# Patient Record
Sex: MALE | Race: WHITE | Employment: UNEMPLOYED | URBAN - METROPOLITAN AREA
[De-identification: names, ages, dates, MRNs, and addresses within clinical notes are randomized per-mention and may not be internally consistent; named-entity substitution may affect disease eponyms.]

---

## 2019-08-08 ENCOUNTER — HOSPITAL ENCOUNTER (INPATIENT)
Age: 39
LOS: 4 days | Discharge: ANOTHER HOSPITAL | DRG: 302 | End: 2019-08-12
Attending: FAMILY MEDICINE | Admitting: FAMILY MEDICINE
Payer: MEDICARE

## 2019-08-08 LAB
APTT: 32.9 SEC (ref 26–36)
HCT VFR BLD CALC: 40.6 % (ref 40.5–52.5)
HEMOGLOBIN: 13.5 G/DL (ref 13.5–17.5)
INR BLD: 0.98 (ref 0.86–1.14)
MCH RBC QN AUTO: 29.8 PG (ref 26–34)
MCHC RBC AUTO-ENTMCNC: 33.3 G/DL (ref 31–36)
MCV RBC AUTO: 89.6 FL (ref 80–100)
PDW BLD-RTO: 13.6 % (ref 12.4–15.4)
PLATELET # BLD: 298 K/UL (ref 135–450)
PMV BLD AUTO: 9.2 FL (ref 5–10.5)
PROTHROMBIN TIME: 11.2 SEC (ref 9.8–13)
RBC # BLD: 4.53 M/UL (ref 4.2–5.9)
TROPONIN: 0.03 NG/ML
WBC # BLD: 8.8 K/UL (ref 4–11)

## 2019-08-08 PROCEDURE — 2060000000 HC ICU INTERMEDIATE R&B

## 2019-08-08 PROCEDURE — 36415 COLL VENOUS BLD VENIPUNCTURE: CPT

## 2019-08-08 PROCEDURE — 93005 ELECTROCARDIOGRAM TRACING: CPT | Performed by: NURSE PRACTITIONER

## 2019-08-08 PROCEDURE — 84484 ASSAY OF TROPONIN QUANT: CPT

## 2019-08-08 PROCEDURE — 6370000000 HC RX 637 (ALT 250 FOR IP): Performed by: NURSE PRACTITIONER

## 2019-08-08 PROCEDURE — 80048 BASIC METABOLIC PNL TOTAL CA: CPT

## 2019-08-08 PROCEDURE — 85610 PROTHROMBIN TIME: CPT

## 2019-08-08 PROCEDURE — 85730 THROMBOPLASTIN TIME PARTIAL: CPT

## 2019-08-08 PROCEDURE — 85027 COMPLETE CBC AUTOMATED: CPT

## 2019-08-08 RX ORDER — TRAZODONE HYDROCHLORIDE 50 MG/1
100 TABLET ORAL NIGHTLY
Status: DISCONTINUED | OUTPATIENT
Start: 2019-08-08 | End: 2019-08-13 | Stop reason: HOSPADM

## 2019-08-08 RX ORDER — SODIUM CHLORIDE 0.9 % (FLUSH) 0.9 %
10 SYRINGE (ML) INJECTION EVERY 12 HOURS SCHEDULED
Status: DISCONTINUED | OUTPATIENT
Start: 2019-08-08 | End: 2019-08-13 | Stop reason: HOSPADM

## 2019-08-08 RX ORDER — ATORVASTATIN CALCIUM 80 MG/1
80 TABLET, FILM COATED ORAL DAILY
Status: DISCONTINUED | OUTPATIENT
Start: 2019-08-09 | End: 2019-08-13 | Stop reason: HOSPADM

## 2019-08-08 RX ORDER — FUROSEMIDE 10 MG/ML
40 INJECTION INTRAMUSCULAR; INTRAVENOUS DAILY
Status: DISCONTINUED | OUTPATIENT
Start: 2019-08-09 | End: 2019-08-10

## 2019-08-08 RX ORDER — SODIUM CHLORIDE 0.9 % (FLUSH) 0.9 %
10 SYRINGE (ML) INJECTION PRN
Status: DISCONTINUED | OUTPATIENT
Start: 2019-08-08 | End: 2019-08-13 | Stop reason: HOSPADM

## 2019-08-08 RX ORDER — LISINOPRIL 20 MG/1
20 TABLET ORAL DAILY
Status: DISCONTINUED | OUTPATIENT
Start: 2019-08-09 | End: 2019-08-13 | Stop reason: HOSPADM

## 2019-08-08 RX ORDER — METOPROLOL TARTRATE 50 MG/1
50 TABLET, FILM COATED ORAL 2 TIMES DAILY
Status: DISCONTINUED | OUTPATIENT
Start: 2019-08-08 | End: 2019-08-08

## 2019-08-08 RX ORDER — ACETAMINOPHEN 325 MG/1
650 TABLET ORAL EVERY 4 HOURS PRN
Status: DISCONTINUED | OUTPATIENT
Start: 2019-08-08 | End: 2019-08-13 | Stop reason: HOSPADM

## 2019-08-08 RX ORDER — ASPIRIN 81 MG/1
81 TABLET, CHEWABLE ORAL DAILY
Status: DISCONTINUED | OUTPATIENT
Start: 2019-08-09 | End: 2019-08-13 | Stop reason: HOSPADM

## 2019-08-08 RX ORDER — ONDANSETRON 2 MG/ML
4 INJECTION INTRAMUSCULAR; INTRAVENOUS EVERY 6 HOURS PRN
Status: DISCONTINUED | OUTPATIENT
Start: 2019-08-08 | End: 2019-08-13 | Stop reason: HOSPADM

## 2019-08-08 RX ORDER — CARVEDILOL 6.25 MG/1
6.25 TABLET ORAL 2 TIMES DAILY WITH MEALS
Status: DISCONTINUED | OUTPATIENT
Start: 2019-08-09 | End: 2019-08-13 | Stop reason: HOSPADM

## 2019-08-08 RX ORDER — ALBUTEROL SULFATE 90 UG/1
2 AEROSOL, METERED RESPIRATORY (INHALATION) EVERY 4 HOURS PRN
Status: DISCONTINUED | OUTPATIENT
Start: 2019-08-08 | End: 2019-08-13 | Stop reason: HOSPADM

## 2019-08-09 ENCOUNTER — APPOINTMENT (OUTPATIENT)
Dept: GENERAL RADIOLOGY | Age: 39
DRG: 302 | End: 2019-08-09
Attending: FAMILY MEDICINE
Payer: MEDICARE

## 2019-08-09 ENCOUNTER — APPOINTMENT (OUTPATIENT)
Dept: CT IMAGING | Age: 39
DRG: 302 | End: 2019-08-09
Attending: FAMILY MEDICINE
Payer: MEDICARE

## 2019-08-09 LAB
ANION GAP SERPL CALCULATED.3IONS-SCNC: 16 MMOL/L (ref 3–16)
BASE EXCESS ARTERIAL: 2.7 MMOL/L (ref -3–3)
BILIRUBIN URINE: NEGATIVE
BLOOD, URINE: NEGATIVE
BUN BLDV-MCNC: 28 MG/DL (ref 7–20)
CALCIUM SERPL-MCNC: 8.8 MG/DL (ref 8.3–10.6)
CARBOXYHEMOGLOBIN ARTERIAL: 1 % (ref 0–1.5)
CHLORIDE BLD-SCNC: 99 MMOL/L (ref 99–110)
CLARITY: ABNORMAL
CO2: 23 MMOL/L (ref 21–32)
COLOR: YELLOW
CREAT SERPL-MCNC: 1.4 MG/DL (ref 0.9–1.3)
EKG ATRIAL RATE: 83 BPM
EKG DIAGNOSIS: NORMAL
EKG P AXIS: 62 DEGREES
EKG P-R INTERVAL: 146 MS
EKG Q-T INTERVAL: 398 MS
EKG QRS DURATION: 102 MS
EKG QTC CALCULATION (BAZETT): 467 MS
EKG R AXIS: 28 DEGREES
EKG T AXIS: 186 DEGREES
EKG VENTRICULAR RATE: 83 BPM
EPITHELIAL CELLS, UA: 0 /HPF (ref 0–5)
GFR AFRICAN AMERICAN: >60
GFR NON-AFRICAN AMERICAN: 56
GLUCOSE BLD-MCNC: 108 MG/DL (ref 70–99)
GLUCOSE URINE: NEGATIVE MG/DL
HCO3 ARTERIAL: 27.4 MMOL/L (ref 21–29)
HEMOGLOBIN, ART, EXTENDED: 14.1 G/DL (ref 13.5–17.5)
HYALINE CASTS: 0 /LPF (ref 0–8)
KETONES, URINE: NEGATIVE MG/DL
LEUKOCYTE ESTERASE, URINE: NEGATIVE
METHEMOGLOBIN ARTERIAL: 0.4 %
MICROSCOPIC EXAMINATION: YES
NITRITE, URINE: NEGATIVE
O2 CONTENT ARTERIAL: 19 ML/DL
O2 SAT, ARTERIAL: 96.6 %
O2 THERAPY: NORMAL
PCO2 ARTERIAL: 41.2 MMHG (ref 35–45)
PH ARTERIAL: 7.43 (ref 7.35–7.45)
PH UA: 7.5 (ref 5–8)
PO2 ARTERIAL: 81.1 MMHG (ref 75–108)
POTASSIUM SERPL-SCNC: 3.9 MMOL/L (ref 3.5–5.1)
PROTEIN UA: NEGATIVE MG/DL
RBC UA: 0 /HPF (ref 0–4)
SODIUM BLD-SCNC: 138 MMOL/L (ref 136–145)
SPECIFIC GRAVITY UA: 1.01 (ref 1–1.03)
TCO2 ARTERIAL: 64.1 MMOL/L
URINE TYPE: ABNORMAL
UROBILINOGEN, URINE: 0.2 E.U./DL
WBC UA: 0 /HPF (ref 0–5)

## 2019-08-09 PROCEDURE — 94760 N-INVAS EAR/PLS OXIMETRY 1: CPT

## 2019-08-09 PROCEDURE — 93880 EXTRACRANIAL BILAT STUDY: CPT

## 2019-08-09 PROCEDURE — 6360000002 HC RX W HCPCS: Performed by: NURSE PRACTITIONER

## 2019-08-09 PROCEDURE — 70450 CT HEAD/BRAIN W/O DYE: CPT

## 2019-08-09 PROCEDURE — 2060000000 HC ICU INTERMEDIATE R&B

## 2019-08-09 PROCEDURE — 99223 1ST HOSP IP/OBS HIGH 75: CPT | Performed by: THORACIC SURGERY (CARDIOTHORACIC VASCULAR SURGERY)

## 2019-08-09 PROCEDURE — 93010 ELECTROCARDIOGRAM REPORT: CPT | Performed by: INTERNAL MEDICINE

## 2019-08-09 PROCEDURE — 82803 BLOOD GASES ANY COMBINATION: CPT

## 2019-08-09 PROCEDURE — 2580000003 HC RX 258: Performed by: NURSE PRACTITIONER

## 2019-08-09 PROCEDURE — 71045 X-RAY EXAM CHEST 1 VIEW: CPT

## 2019-08-09 PROCEDURE — 71250 CT THORAX DX C-: CPT

## 2019-08-09 PROCEDURE — 81001 URINALYSIS AUTO W/SCOPE: CPT

## 2019-08-09 PROCEDURE — 99223 1ST HOSP IP/OBS HIGH 75: CPT | Performed by: INTERNAL MEDICINE

## 2019-08-09 PROCEDURE — 94010 BREATHING CAPACITY TEST: CPT

## 2019-08-09 PROCEDURE — 93971 EXTREMITY STUDY: CPT

## 2019-08-09 PROCEDURE — 36600 WITHDRAWAL OF ARTERIAL BLOOD: CPT

## 2019-08-09 PROCEDURE — 6370000000 HC RX 637 (ALT 250 FOR IP): Performed by: NURSE PRACTITIONER

## 2019-08-09 RX ADMIN — TRAZODONE HYDROCHLORIDE 100 MG: 50 TABLET ORAL at 21:17

## 2019-08-09 RX ADMIN — Medication 10 ML: at 21:18

## 2019-08-09 RX ADMIN — Medication 10 ML: at 00:01

## 2019-08-09 RX ADMIN — TRAZODONE HYDROCHLORIDE 100 MG: 50 TABLET ORAL at 00:01

## 2019-08-09 RX ADMIN — CARVEDILOL 6.25 MG: 6.25 TABLET, FILM COATED ORAL at 16:41

## 2019-08-09 RX ADMIN — ATORVASTATIN CALCIUM 80 MG: 80 TABLET, FILM COATED ORAL at 10:06

## 2019-08-09 RX ADMIN — FUROSEMIDE 40 MG: 10 INJECTION, SOLUTION INTRAMUSCULAR; INTRAVENOUS at 10:07

## 2019-08-09 RX ADMIN — ASPIRIN 81 MG 81 MG: 81 TABLET ORAL at 10:06

## 2019-08-09 RX ADMIN — CARVEDILOL 6.25 MG: 6.25 TABLET, FILM COATED ORAL at 10:06

## 2019-08-09 RX ADMIN — ACETAMINOPHEN 650 MG: 325 TABLET, FILM COATED ORAL at 21:17

## 2019-08-09 RX ADMIN — Medication 10 ML: at 10:08

## 2019-08-09 RX ADMIN — LISINOPRIL 20 MG: 20 TABLET ORAL at 10:06

## 2019-08-09 RX ADMIN — Medication 10 ML: at 21:19

## 2019-08-09 ASSESSMENT — PAIN SCALES - GENERAL
PAINLEVEL_OUTOF10: 0
PAINLEVEL_OUTOF10: 0
PAINLEVEL_OUTOF10: 7
PAINLEVEL_OUTOF10: 0

## 2019-08-09 ASSESSMENT — PAIN DESCRIPTION - LOCATION: LOCATION: OTHER (COMMENT)

## 2019-08-09 ASSESSMENT — PAIN DESCRIPTION - ORIENTATION: ORIENTATION: RIGHT

## 2019-08-09 ASSESSMENT — PAIN DESCRIPTION - DESCRIPTORS: DESCRIPTORS: ACHING

## 2019-08-09 ASSESSMENT — PAIN DESCRIPTION - PAIN TYPE: TYPE: ACUTE PAIN

## 2019-08-09 NOTE — CONSULTS
2, Left dorsalis pedis 2, Right posterior tibial 2, Left Posterior tibial 2, Right Femoral 2, Left Femoral 2, Right radial 2, and Left radial 2    Abdomen:  Soft, normal bowel sounds, non-tender, no hepatosplenomegaly, aorta normal and bruits absent    Musculoskeletal:  Back is straight and non-tender, full ROM of upper extremities. Decreased strength and spasticity of left lower extremity resulting in a shuffling gait. Extremities:   No clubbing, cyanosis, or edema     Skin: warm and normal turgor, no ulcers, infections, or rashes, no rashes, no ecchymoses, no petechiae, no nodules    Neurological: awake, alert and oriented x 3, motor 5/5 bilateral upper extremities. Lower extremities is 2 out of 5 in the left 5 out of 5 on the right. Increased muscle spasticity noted on the left lower extremity.   Sensation grossly intact    Psychiatric: Mood and affect appear appropriate    DATA:    EKG:    Sinus rhythm with occasional Premature ventricular complexes     CBC:   Lab Results   Component Value Date    WBC 8.8 08/08/2019    RBC 4.53 08/08/2019    HGB 13.5 08/08/2019    HCT 40.6 08/08/2019    MCV 89.6 08/08/2019    MCH 29.8 08/08/2019    MCHC 33.3 08/08/2019    RDW 13.6 08/08/2019     08/08/2019    MPV 9.2 08/08/2019     CMP:    Lab Results   Component Value Date     08/08/2019    K 3.9 08/08/2019    CL 99 08/08/2019    CO2 23 08/08/2019    BUN 28 08/08/2019    CREATININE 1.4 08/08/2019    GFRAA >60 08/08/2019    LABGLOM 56 08/08/2019    GLUCOSE 108 08/08/2019    PROT 7.6 12/12/2012    LABALBU 4.8 12/12/2012    CALCIUM 8.8 08/08/2019    BILITOT 0.40 12/12/2012    ALKPHOS 67 12/12/2012    AST 21 12/12/2012    ALT 35 12/12/2012     PT/INR:    Lab Results   Component Value Date    PROTIME 11.2 08/08/2019    PROTIME 30.2 03/19/2014    INR 0.98 08/08/2019     PTT:    Lab Results   Component Value Date    APTT 32.9 08/08/2019   [APTT}  Last 3 Troponin:    Lab Results   Component Value Date    TROPONINI 0.03 08/08/2019       ECHO/TORRI: 8/6/19  Left ventricle: The cavity size was normal. Wall thickness was      increased in a pattern of mild LVH. Systolic function was      moderately to severely reduced. The estimated ejection fraction      was in the range of 30% to 35%. Diffuse hypokinesis. Features are      consistent with a pseudonormal left ventricular filling pattern,      with concomitant abnormal relaxation and increased filling      pressure (grade 2 diastolic dysfunction).     - Mitral valve: Valve area by pressure half-time: 2.3cm^2.    - Left atrium: The atrium was mildly dilated.    - Right ventricle: Systolic function was normal by visual      assessment.    - Atrial septum: No defect or patent foramen ovale was identified.      Echo contrast study showed no right-to-left atrial level shunt, at      baseline or with provocation.    - Pulmonic valve: Peak gradient (S): 10mm Hg.        Impressions:  Severe left ventricular systolic dysfunction EF 07-63%      NM Myocardial Perf Multi Spect: 8/6/19    1. .Small mild reversible defect in the basal anterior wall . No perfusion defects on stress or rest images. The left ventricular chamber size is severely dilated . Severe global hypokinesis. 2. Severe global hypokinesis. EF 19%. Angiogram: From [facility]    Right Dominant  1. LAD: Proximal vessel lesion: There is a 60% stenosis.    2. 1st diagonal: Proximal vessel lesion: There is a 70% stenosis.    3. Left circumflex: Mid-vessel lesion: There is a 50% stenosis.    4. Right coronary: Proximal vessel lesion: There is a 100% stenosis.     Neurology:10/28/16 Last Visit    Sonam Taylor MD - 10/28/2016 8:45 AM EDT    HISTORY OF PRESENT ILLNESS: Mr. Yunior Hauser is seen in follow-up for a history of posterior circulation strokes, pseudobulbar affect, and insomnia. I last saw him a year ago. He had stopped trazodone, but insomnia returned.  After discussing potential side effects including on the RPDA which feels by left-to-right collaterals, LAD, OM1 and possible diagonal.  Before scheduling for CABG we will complete standard preoperative work-up. Will need preoperative neurology clearance to assess the perioperative risk of  stroke with heparinization for cardiopulmonary bypass and to assist with perioperative management of his spasticity. Will need PT/OT evaluation and optimization of his mobility status so he can be compliant with sternotomy precautions during his postoperative recovery. We will discuss with heart failure team about consideration for preoperative optimization with inotropes before CABG. All of his questions were answered. CT surgery will follow along and make further recommendations as needed. Based on current scheduling the earliest I could perform his surgery would be Wednesday, 8/14/2019. Dr. Clayton Katz, thank you very much for allowing us to participate in the care of this patient.

## 2019-08-09 NOTE — PROGRESS NOTES
Home med rec completed. Pt states he has not had any home medications in over a month other than recent OTC prilosec.

## 2019-08-09 NOTE — PROGRESS NOTES
Per pt his doctor put his films from the heart cath in his chart over at [facility] but they didn't make it over here when he was transferred. Pt's hard copy chart and belongings checked. This RN made cath lab aware that the films are not here. Per cath lab request-  Films need to be sent by  or  If just report- Fax [phone] with Marielena Monsalve at [facility]. They are going to send them to [facility]- they are films one of echo and one of heart cath. Cardiology called and updated that films will be sent over.

## 2019-08-09 NOTE — PROGRESS NOTES
tenderness, not distended, normal bowel sounds  Musculoskeletal: No cyanosis in digits, neck supple  Neurology: CN 2-12 grossly intact. No speech or motor deficits  Psych: Normal affect. Alert and oriented in time, place and person  Skin: Warm, dry, normal turgor    Labs and Tests:  CBC:   Recent Labs     08/08/19  2248   WBC 8.8   HGB 13.5        BMP:  Recent Labs     08/08/19  2248      K 3.9   CL 99   CO2 23   BUN 28*   CREATININE 1.4*   GLUCOSE 108*     Hepatic: No results for input(s): AST, ALT, ALB, BILITOT, ALKPHOS in the last 72 hours. Discussed care with family and patient             Spent 30  minutes with patient and family at bedside and on unit reviewing medical records and labs, spent greater than 50% time counseling patient and family on diagnosis and plan   Problem List  Active Problems:    * No active hospital problems. *  Resolved Problems:    * No resolved hospital problems. *       Assessment & Plan:   1. Systolic CHF  - with recent CAD. Ef 30-35%. - contnue asa, cored statin.    -continue iv diuresis  - Cardiology consulted. Await recs. rec was CABG.      2. SARA  - had normal creatine about a month ago  - will monitor closely  - nephrology recs    3       Diet: Diet NPO Time Specified  Code:Full Code  DVT PPX lovenox       Syd Phillips MD   8/9/2019 10:15 AM

## 2019-08-09 NOTE — H&P
Hospital Medicine History & Physical      PCP: Faustino Iverson, pt states he sees Dr. Lincoln Hopkins    Date of Admission: 8/8/2019    Date of Service: Pt seen/examined on August 8, 2019 at 8:50pm and transferred for admission from [facility] to Inpatient with expected LOS greater than two midnights due to medical therapy. Chief Complaint:  Shortness of breath for about a month worsening recently. History Of Present Illness:      35 y.o. male with PMHx of newly diagnosed chronic systolic CHF and cardiomyopathy as below workup at OSH, PMH multiple strokes with some slowing/soft speech as baseline residual deficit, Hypertension, hyperlipidemia, depression, insomnia, GERD, TONO (does not use CPAP) presented to [facility] for CABG to be done as a transfer from [facility].    Apparently, The patient went to ER yesterday at [facility] for SOB and they worked him up as below and then after his catheterization, they admitted him but he had to leave because he has pets and lives alone. Then, His sister took him back to the ER today at [facility] and he was transferred here for CABG per Cardiology at [facility]. He had a left heart catheterization yesterday August 7, 2019 by  Belkys San MD.  Results below. [facility] work-up 8/7/19: c/o  shortness of breath. D-dimer elevated at 777, CTPA which was negative for a PE. Left ventricle, and groundglass infiltrate. Suggesting pulmonary edema from congestive heart failure. Patient has small right pleural effusion on CTPA results. Ileostomy right hilar lymphadenopathy. This may be reactive lymphadenopathy. Neoplastic etiology is a differential consideration. Consider short-term CT follow-up in 3 months to confirm. Urine drug screen positive for cannabinoid. UA otherwise negative    Chest x-ray borderline cardiomegaly with mild interstitial infiltrates. Correlate for pulmonary edema.     Troponin 0.147    Lactic acid 1, PTT 34.3, PT and INR 10.9 and 0.9 respectively. CBC with white blood cells 11.3, hemoglobin 13. Absolute neutrophils 8.3 otherwise normal.    BNP 5334. Lipase normal at 180. CMP with chloride 110, CO2 20, BUN 19, calcium 8.3, AST 43, albumin 3.2, otherwise normal.    Today's labs from 6 AM this morning when patient returned to [facility], CMP unremarkable, CBC normal.  TSH normal at 2.59. Hepatic panel grossly normal      Left heart cath angiography 8/7/19:   1. LAD: proximal vessel lesion: there is 60% stenosis  2. 1st diagonal: Proximal vessel lesion: there is 70% stenosis  3. Left circumflex: mid-vessel lesion: there is 50% stenosis  4. Right coronary: proximal lesion: 100%     Graded exercise test, 8/6/2019: Nondiagnostic stress test.  Heart rate was suboptimal to rule out ischemia. NM myocardial perfusion stress and rest SPECT: #1 small mild reversible defect in the basal  anterior wall  No perfusion defects on stress or rest images. The left ventricle chamber size is severely dilated. Severe global hypokinesis. #2 severe global hypokinesis. EF 19%. Echocardiogram 8/6/2019:  Left ventricle: Cavity size normal.  Wall thickness increased in a pattern of mild LVH. Systolic function moderately to severely reduced. Estimated ejection fraction 30 to 35%. Diffuse hypokinesis. Consistent with pseudo-normal left ventricular filling pattern, with concomitant abnormal relaxation and increased filling pressure. Grade 2 diastolic dysfunction.   Left atrium mildly dilated    Troponin 8/6/19: 0.06  Cluster ALT to 65  Triglycerides 148  HDL 45          Past Medical History:          Diagnosis Date    Arthritis     At risk for falls     Blood clots in biliary tract following procedure     CVA (cerebral infarction)     GERD (gastroesophageal reflux disease)     High blood pressure     Hyperlipidemia     Hypertension     Spasticity 1/22/2013    Stroke ([address] distress, appears stated age and cooperative. HEENT:  Normal cephalic, atraumatic without obvious deformity. Pupils equal, round, and reactive to light. Extra ocular muscles intact. Conjunctivae/corneas clear. Neck: Supple, with full range of motion. No jugular venous distention. Trachea midline. Respiratory:  Normal respiratory effort. Clear to auscultation, bilaterally without Rales/Wheezes/Rhonchi. Cardiovascular:  Regular rate and rhythm without murmurs, rubs or gallops. Abdomen: Soft, non-tender, non-distended, without rebound or guarding. Normal bowel sounds. Musculoskeletal:  No clubbing, cyanosis or edema bilaterally. Full range of motion without deformity. Skin: Skin color, texture, turgor normal.  No rashes or lesions. Neurologic:  Neurovascularly intact without any focal sensory/motor deficits. Cranial nerves: II-XII intact, grossly non-focal.  Psychiatric:  Alert and oriented, thought content appropriate, normal insight  Capillary Refill: Brisk,< 3 seconds   Peripheral Pulses: +2 palpable, equal bilaterally       Labs:     No results for input(s): WBC, HGB, HCT, PLT in the last 72 hours. No results for input(s): NA, K, CL, CO2, BUN, CREATININE, CALCIUM, PHOS in the last 72 hours. Invalid input(s): MAGNES  No results for input(s): AST, ALT, BILIDIR, BILITOT, ALKPHOS in the last 72 hours. No results for input(s): INR in the last 72 hours. No results for input(s): Sweta Timpson in the last 72 hours. Urinalysis:    No results found for: Dickie Kidney, BACTERIA, RBCUA, BLOODU, [address], [address]    Radiology:         No orders to display       ASSESSMENT:    There are no active hospital problems to display for this patient. PLAN:  1. Newly diagnosed CAD. cardiac cath 8/7/19:   1. LAD: proximal vessel lesion: 60% stenosis    2. 1st diagonal: prox vessel lesion: 70% stenosis   3. Left circumflex: mid-vessel esion: 50% stenosis   4.  Right coronary: prox vessel lesion: 100%

## 2019-08-09 NOTE — PROGRESS NOTES
[facility]   Cardiac Evaluation      Patient: Edmond Raza  YOB: 1980    CC: transfer for CAD     Referring provider: Maxim Mc    History of Present Illness:   36 yo WM admitted for evaluation for CABG. He had a cath at [facility] 2 days ago and came here for surgery. He states he was also treated for heart failure. He has a hx of strokes and his speech is difficult to understand. His trops were mildly elevated at [facility] and cath was done reported that he had multivessel disease. Frutoso Golder No films are here for review. Past Medical History:   has a past medical history of Arthritis, At risk for falls, Blood clots in biliary tract following procedure, CVA (cerebral infarction), GERD (gastroesophageal reflux disease), High blood pressure, Hyperlipidemia, Hypertension, Spasticity, Stroke ([address] and Unspecified sleep apnea. Surgical History:   has a past surgical history that includes Knee arthroscopy; hernia repair; and fracture surgery.      Current Facility-Administered Medications   Medication Dose Route Frequency Provider Last Rate Last Dose    sodium chloride flush 0.9 % injection 10 mL  10 mL Intravenous 2 times per day Lupe Seek, APRN - CNP   10 mL at 08/09/19 1008    sodium chloride flush 0.9 % injection 10 mL  10 mL Intravenous PRN Lupe Seek, APRN - CNP        magnesium hydroxide (MILK OF MAGNESIA) 400 MG/5ML suspension 30 mL  30 mL Oral Daily PRN Lupe Seek, APRN - CNP        ondansetron (ZOFRAN) injection 4 mg  4 mg Intravenous Q6H PRN Lupe Seek, APRN - CNP        acetaminophen (TYLENOL) tablet 650 mg  650 mg Oral Q4H PRN Lupe Seek, APRN - CNP        furosemide (LASIX) injection 40 mg  40 mg Intravenous Daily Lupe Seek, APRN - CNP   40 mg at 08/09/19 1007    albuterol sulfate  (90 Base) MCG/ACT inhaler 2 puff  2 puff Inhalation Q4H PRN Lupe Seek, APRN - CNP        atorvastatin (LIPITOR) tablet 80 mg  80 mg Oral Daily Ric Ardon APRN - CNP   80 mg at 08/09/19 1006    carvedilol (COREG) tablet 6.25 mg  6.25 mg Oral BID WC Ric Ardon, APRN - CNP   6.25 mg at 08/09/19 1006    aspirin chewable tablet 81 mg  81 mg Oral Daily Lincolnta Duglas, APRN - CNP   81 mg at 08/09/19 1006    lisinopril (PRINIVIL;ZESTRIL) tablet 20 mg  20 mg Oral Daily Ric Ardon APRN - CNP   20 mg at 08/09/19 1006    traZODone (DESYREL) tablet 100 mg  100 mg Oral Nightly Ric Ardon, APRN - CNP   100 mg at 08/09/19 0001       Allergies:  Pcn [penicillins] and Buspirone     Social History:  Social History     Socioeconomic History    Marital status:      Spouse name: Not on file    Number of children: 1    Years of education: Not on file    Highest education level: Not on file   Occupational History    Occupation: disabled   Social Needs    Financial resource strain: Not on file    Food insecurity:     Worry: Not on file     Inability: Not on file   [employer] needs:     Medical: Not on file     Non-medical: Not on file   Tobacco Use    Smoking status: Former Smoker   Substance and Sexual Activity    Alcohol use: No    Drug use: No    Sexual activity: Not on file   Lifestyle    Physical activity:     Days per week: Not on file     Minutes per session: Not on file    Stress: Not on file   Relationships    Social connections:     Talks on phone: Not on file     Gets together: Not on file     Attends Orthodox service: Not on file     Active member of club or organization: Not on file     Attends meetings of clubs or organizations: Not on file     Relationship status: Not on file    Intimate partner violence:     Fear of current or ex partner: Not on file     Emotionally abused: Not on file     Physically abused: Not on file     Forced sexual activity: Not on file   Other Topics Concern    Not on file   Social History Narrative    Not on file       Family History:   Family History   Problem Relation

## 2019-08-10 LAB
ANION GAP SERPL CALCULATED.3IONS-SCNC: 10 MMOL/L (ref 3–16)
BUN BLDV-MCNC: 21 MG/DL (ref 7–20)
CALCIUM SERPL-MCNC: 8.9 MG/DL (ref 8.3–10.6)
CHLORIDE BLD-SCNC: 102 MMOL/L (ref 99–110)
CO2: 26 MMOL/L (ref 21–32)
CREAT SERPL-MCNC: 1.1 MG/DL (ref 0.9–1.3)
GFR AFRICAN AMERICAN: >60
GFR NON-AFRICAN AMERICAN: >60
GLUCOSE BLD-MCNC: 122 MG/DL (ref 70–99)
LV EF: 28 %
LVEF MODALITY: NORMAL
POTASSIUM SERPL-SCNC: 4.8 MMOL/L (ref 3.5–5.1)
SODIUM BLD-SCNC: 138 MMOL/L (ref 136–145)

## 2019-08-10 PROCEDURE — 36415 COLL VENOUS BLD VENIPUNCTURE: CPT

## 2019-08-10 PROCEDURE — 80048 BASIC METABOLIC PNL TOTAL CA: CPT

## 2019-08-10 PROCEDURE — 2060000000 HC ICU INTERMEDIATE R&B

## 2019-08-10 PROCEDURE — 6370000000 HC RX 637 (ALT 250 FOR IP): Performed by: INTERNAL MEDICINE

## 2019-08-10 PROCEDURE — 94760 N-INVAS EAR/PLS OXIMETRY 1: CPT

## 2019-08-10 PROCEDURE — 6360000002 HC RX W HCPCS: Performed by: NURSE PRACTITIONER

## 2019-08-10 PROCEDURE — 99232 SBSQ HOSP IP/OBS MODERATE 35: CPT | Performed by: INTERNAL MEDICINE

## 2019-08-10 PROCEDURE — 6370000000 HC RX 637 (ALT 250 FOR IP): Performed by: NURSE PRACTITIONER

## 2019-08-10 PROCEDURE — 6370000000 HC RX 637 (ALT 250 FOR IP): Performed by: HOSPITALIST

## 2019-08-10 PROCEDURE — 2580000003 HC RX 258: Performed by: NURSE PRACTITIONER

## 2019-08-10 PROCEDURE — 93306 TTE W/DOPPLER COMPLETE: CPT

## 2019-08-10 RX ORDER — FUROSEMIDE 40 MG/1
40 TABLET ORAL 2 TIMES DAILY
Status: DISCONTINUED | OUTPATIENT
Start: 2019-08-10 | End: 2019-08-12

## 2019-08-10 RX ORDER — HYDROXYZINE HYDROCHLORIDE 25 MG/1
25 TABLET, FILM COATED ORAL 3 TIMES DAILY PRN
Status: DISCONTINUED | OUTPATIENT
Start: 2019-08-10 | End: 2019-08-13 | Stop reason: HOSPADM

## 2019-08-10 RX ORDER — PANTOPRAZOLE SODIUM 40 MG/1
40 TABLET, DELAYED RELEASE ORAL
Status: DISCONTINUED | OUTPATIENT
Start: 2019-08-10 | End: 2019-08-13 | Stop reason: HOSPADM

## 2019-08-10 RX ORDER — HYDROXYZINE PAMOATE 25 MG/1
25 CAPSULE ORAL 3 TIMES DAILY PRN
Status: DISCONTINUED | OUTPATIENT
Start: 2019-08-10 | End: 2019-08-10 | Stop reason: ALTCHOICE

## 2019-08-10 RX ADMIN — CARVEDILOL 6.25 MG: 6.25 TABLET, FILM COATED ORAL at 10:12

## 2019-08-10 RX ADMIN — LISINOPRIL 20 MG: 20 TABLET ORAL at 10:11

## 2019-08-10 RX ADMIN — Medication 10 ML: at 10:12

## 2019-08-10 RX ADMIN — TRAZODONE HYDROCHLORIDE 100 MG: 50 TABLET ORAL at 22:12

## 2019-08-10 RX ADMIN — FUROSEMIDE 40 MG: 10 INJECTION, SOLUTION INTRAMUSCULAR; INTRAVENOUS at 10:12

## 2019-08-10 RX ADMIN — Medication 10 ML: at 22:12

## 2019-08-10 RX ADMIN — HYDROXYZINE HYDROCHLORIDE 25 MG: 25 TABLET, FILM COATED ORAL at 14:14

## 2019-08-10 RX ADMIN — HYDROXYZINE HYDROCHLORIDE 25 MG: 25 TABLET, FILM COATED ORAL at 22:14

## 2019-08-10 RX ADMIN — ATORVASTATIN CALCIUM 80 MG: 80 TABLET, FILM COATED ORAL at 10:12

## 2019-08-10 RX ADMIN — ASPIRIN 81 MG 81 MG: 81 TABLET ORAL at 10:11

## 2019-08-10 RX ADMIN — PANTOPRAZOLE SODIUM 40 MG: 40 TABLET, DELAYED RELEASE ORAL at 10:11

## 2019-08-10 RX ADMIN — FUROSEMIDE 40 MG: 40 TABLET ORAL at 18:13

## 2019-08-10 ASSESSMENT — ENCOUNTER SYMPTOMS
SHORTNESS OF BREATH: 1
CHEST TIGHTNESS: 0
COUGH: 0
NAUSEA: 0
BLOOD IN STOOL: 0
VOMITING: 0
CONSTIPATION: 0
EYE REDNESS: 0
ABDOMINAL DISTENTION: 0
ABDOMINAL PAIN: 0
PHOTOPHOBIA: 0
DIARRHEA: 0
EYE DISCHARGE: 0
FACIAL SWELLING: 0

## 2019-08-10 ASSESSMENT — PAIN SCALES - GENERAL
PAINLEVEL_OUTOF10: 0

## 2019-08-10 NOTE — PROGRESS NOTES
[facility]   Cardiac Consultation    Referring Provider:  Indira Francis   CC: CAD, LV dysfxn, MARCELINO       History of Present Illness:  Alert, up in room-now back from Echo--denies CP, SOB, palpitations or syncope. Gait is unsteady due to prior CVA. Cines not yet available for review. Past Medical History:   has a past medical history of Arthritis, At risk for falls, Blood clots in biliary tract following procedure, CVA (cerebral infarction), GERD (gastroesophageal reflux disease), High blood pressure, Hyperlipidemia, Hypertension, Spasticity, Stroke [address] [address] and Unspecified sleep apnea. Review of Systems:   · Constitutional: there has been no unanticipated weight loss. There's been no change in energy level, sleep pattern, or activity level. · Eyes: No visual changes or diplopia. No scleral icterus. · ENT: No Headaches, hearing loss or vertigo. No mouth sores or sore throat. · Cardiovascular: Reviewed in HPI  · Respiratory: No cough or wheezing, no sputum production. No hematemesis. · Gastrointestinal: No abdominal pain, appetite loss, blood in stools. No change in bowel or bladder habits. · Genitourinary: No dysuria, trouble voiding, or hematuria. · Musculoskeletal:  No gait disturbance, weakness or joint complaints. · Integumentary: No rash or pruritis. · Neurological: No headache, diplopia, change in muscle strength, numbness or tingling. No change in gait, balance, coordination, mood, affect, memory, mentation, behavior. · Psychiatric: No anxiety, no depression. · Endocrine: No malaise, fatigue or temperature intolerance. No excessive thirst, fluid intake, or urination. No tremor. · Hematologic/Lymphatic: No abnormal bruising or bleeding, blood clots or swollen lymph nodes. · Allergic/Immunologic: No nasal congestion or hives.     Physical Examination:    Vitals:    08/10/19 1000   BP: 114/77   Pulse: 76   Resp: 17   Temp: 97.8 °F (36.6 °C)   SpO2: 96%          Constitutional

## 2019-08-11 LAB
ANION GAP SERPL CALCULATED.3IONS-SCNC: 11 MMOL/L (ref 3–16)
BUN BLDV-MCNC: 22 MG/DL (ref 7–20)
CALCIUM SERPL-MCNC: 9.4 MG/DL (ref 8.3–10.6)
CHLORIDE BLD-SCNC: 99 MMOL/L (ref 99–110)
CO2: 25 MMOL/L (ref 21–32)
CREAT SERPL-MCNC: 1.3 MG/DL (ref 0.9–1.3)
GFR AFRICAN AMERICAN: >60
GFR NON-AFRICAN AMERICAN: >60
GLUCOSE BLD-MCNC: 120 MG/DL (ref 70–99)
POTASSIUM SERPL-SCNC: 4.9 MMOL/L (ref 3.5–5.1)
SODIUM BLD-SCNC: 135 MMOL/L (ref 136–145)

## 2019-08-11 PROCEDURE — 94640 AIRWAY INHALATION TREATMENT: CPT

## 2019-08-11 PROCEDURE — 6360000002 HC RX W HCPCS: Performed by: INTERNAL MEDICINE

## 2019-08-11 PROCEDURE — 2060000000 HC ICU INTERMEDIATE R&B

## 2019-08-11 PROCEDURE — 2700000000 HC OXYGEN THERAPY PER DAY

## 2019-08-11 PROCEDURE — 99232 SBSQ HOSP IP/OBS MODERATE 35: CPT | Performed by: THORACIC SURGERY (CARDIOTHORACIC VASCULAR SURGERY)

## 2019-08-11 PROCEDURE — 94761 N-INVAS EAR/PLS OXIMETRY MLT: CPT

## 2019-08-11 PROCEDURE — 2580000003 HC RX 258: Performed by: NURSE PRACTITIONER

## 2019-08-11 PROCEDURE — 6370000000 HC RX 637 (ALT 250 FOR IP): Performed by: INTERNAL MEDICINE

## 2019-08-11 PROCEDURE — 6370000000 HC RX 637 (ALT 250 FOR IP): Performed by: THORACIC SURGERY (CARDIOTHORACIC VASCULAR SURGERY)

## 2019-08-11 PROCEDURE — 36415 COLL VENOUS BLD VENIPUNCTURE: CPT

## 2019-08-11 PROCEDURE — 80048 BASIC METABOLIC PNL TOTAL CA: CPT

## 2019-08-11 PROCEDURE — 6370000000 HC RX 637 (ALT 250 FOR IP): Performed by: NURSE PRACTITIONER

## 2019-08-11 RX ORDER — IPRATROPIUM BROMIDE AND ALBUTEROL SULFATE 2.5; .5 MG/3ML; MG/3ML
1 SOLUTION RESPIRATORY (INHALATION) 4 TIMES DAILY
Status: DISCONTINUED | OUTPATIENT
Start: 2019-08-11 | End: 2019-08-13 | Stop reason: HOSPADM

## 2019-08-11 RX ADMIN — IPRATROPIUM BROMIDE AND ALBUTEROL SULFATE 1 AMPULE: .5; 3 SOLUTION RESPIRATORY (INHALATION) at 17:14

## 2019-08-11 RX ADMIN — Medication 10 ML: at 22:38

## 2019-08-11 RX ADMIN — CARVEDILOL 6.25 MG: 6.25 TABLET, FILM COATED ORAL at 16:49

## 2019-08-11 RX ADMIN — PANTOPRAZOLE SODIUM 40 MG: 40 TABLET, DELAYED RELEASE ORAL at 05:02

## 2019-08-11 RX ADMIN — ENOXAPARIN SODIUM 40 MG: 40 INJECTION SUBCUTANEOUS at 08:40

## 2019-08-11 RX ADMIN — FUROSEMIDE 40 MG: 40 TABLET ORAL at 08:40

## 2019-08-11 RX ADMIN — CARVEDILOL 6.25 MG: 6.25 TABLET, FILM COATED ORAL at 08:39

## 2019-08-11 RX ADMIN — LISINOPRIL 20 MG: 20 TABLET ORAL at 08:39

## 2019-08-11 RX ADMIN — Medication 10 ML: at 08:40

## 2019-08-11 RX ADMIN — ATORVASTATIN CALCIUM 80 MG: 80 TABLET, FILM COATED ORAL at 08:39

## 2019-08-11 RX ADMIN — ASPIRIN 81 MG 81 MG: 81 TABLET ORAL at 08:40

## 2019-08-11 RX ADMIN — TRAZODONE HYDROCHLORIDE 100 MG: 50 TABLET ORAL at 22:58

## 2019-08-11 RX ADMIN — FUROSEMIDE 40 MG: 40 TABLET ORAL at 16:49

## 2019-08-11 RX ADMIN — IPRATROPIUM BROMIDE AND ALBUTEROL SULFATE 1 AMPULE: .5; 3 SOLUTION RESPIRATORY (INHALATION) at 20:40

## 2019-08-11 ASSESSMENT — PAIN SCALES - GENERAL
PAINLEVEL_OUTOF10: 0

## 2019-08-11 ASSESSMENT — ENCOUNTER SYMPTOMS
PHOTOPHOBIA: 0
DIARRHEA: 0
ABDOMINAL PAIN: 0
VOMITING: 0
CHEST TIGHTNESS: 0
FACIAL SWELLING: 0
COUGH: 0
SHORTNESS OF BREATH: 1
NAUSEA: 0
ABDOMINAL DISTENTION: 0
CONSTIPATION: 0
BLOOD IN STOOL: 0
EYE REDNESS: 0
EYE DISCHARGE: 0

## 2019-08-11 NOTE — PROGRESS NOTES
[facility]   Cardiac Consultation    Referring Provider:  Torito Avendaño   CC: CAD, LV dysfxn, MARCELINO       History of Present Illness:  Patient denies any acute events. He denies any chest pain or SOB. Gait is unsteady due to prior CVA. Cines not yet available for review. Past Medical History:   has a past medical history of Arthritis, At risk for falls, Blood clots in biliary tract following procedure, CVA (cerebral infarction), GERD (gastroesophageal reflux disease), High blood pressure, Hyperlipidemia, Hypertension, Spasticity, Stroke [facility] and Unspecified sleep apnea. Review of Systems:   · Constitutional: there has been no unanticipated weight loss. There's been no change in energy level, sleep pattern, or activity level. · Eyes: No visual changes or diplopia. No scleral icterus. · ENT: No Headaches, hearing loss or vertigo. No mouth sores or sore throat. · Cardiovascular: Reviewed in HPI  · Respiratory: No cough or wheezing, no sputum production. No hematemesis. · Gastrointestinal: No abdominal pain, appetite loss, blood in stools. No change in bowel or bladder habits. · Genitourinary: No dysuria, trouble voiding, or hematuria. · Musculoskeletal:  No gait disturbance, weakness or joint complaints. · Integumentary: No rash or pruritis. · Neurological: No headache, diplopia, change in muscle strength, numbness or tingling. No change in gait, balance, coordination, mood, affect, memory, mentation, behavior. · Psychiatric: No anxiety, no depression. · Endocrine: No malaise, fatigue or temperature intolerance. No excessive thirst, fluid intake, or urination. No tremor. · Hematologic/Lymphatic: No abnormal bruising or bleeding, blood clots or swollen lymph nodes. · Allergic/Immunologic: No nasal congestion or hives.     Physical Examination:    Vitals:    08/11/19 0838   BP: 106/64   Pulse: 75   Resp: 18   Temp: 98.3 °F (36.8 °C)   SpO2: 95%            ECHO: 30-35%    Constitutional and General Appearance:   . NAD   SKIN:  .     Warm and dry  EYES:    .     EOMI  Neck:   . Normal carotid contour  Respiratory:  · Normal excursion and expansion without use of accessory muscles  · Resp Auscultation: Normal breath sounds without dullness  Cardiovascular:  · The apical impulses not displaced  · Heart tones are crisp and normal  · Cervical veins are not engorged  · Normal S1S2, No S3, No Murmur  · Peripheral pulses are symmetrical and full  Extremities:  · There is no clubbing, cyanosis of the extremities. · No edema  · Femoral Arteries: 2+ and equal  · Pedal Pulses: 2+ and equal   Abdomen:  · No masses or tenderness  · Liver/Spleen: No Abnormalities Noted  Neurological/Psychiatric:  · Alert and oriented in all spheres  · Moves all extremities well  · Exhibits normal gait balance and coordination  · No abnormalities of mood, affect, memory, mentation, or behavior are noted    All testing and labs listed below were personally reviewed. Assessment:   Multivessel CAD  Hx of CVA  ARF  LV dysfxn- EF 25-30%    Plan:  CABG schedule mid week ?     Codi Hernandez CNP

## 2019-08-11 NOTE — PROGRESS NOTES
[facility] Note    Patient Active Problem List   Diagnosis    Spasticity    Stroke syndrome    Spasm of muscle    Left wrist injury    Strain of right wrist    Right knee pain    CAD, multiple vessel       Past Medical History:   has a past medical history of Arthritis, At risk for falls, Blood clots in biliary tract following procedure, CVA (cerebral infarction), GERD (gastroesophageal reflux disease), High blood pressure, Hyperlipidemia, Hypertension, Spasticity, Stroke ([address] and Unspecified sleep apnea. Past Social History:   reports that he has quit smoking. He does not have any smokeless tobacco history on file. He reports that he does not drink alcohol or use drugs. Subjective:  Breathing better. No chest pain    Review of Systems   Constitutional: Negative for activity change, appetite change, chills, fatigue, fever and unexpected weight change. HENT: Negative for congestion and facial swelling. Eyes: Negative for photophobia, discharge and redness. Respiratory: Positive for shortness of breath. Negative for cough and chest tightness. Cardiovascular: Negative for chest pain, palpitations and leg swelling. Gastrointestinal: Negative for abdominal distention, abdominal pain, blood in stool, constipation, diarrhea, nausea and vomiting. Endocrine: Negative for cold intolerance, heat intolerance and polyuria. Genitourinary: Negative for decreased urine volume, difficulty urinating, flank pain and hematuria. Musculoskeletal: Negative for joint swelling and neck pain. Neurological: Negative for dizziness, seizures, syncope, speech difficulty, light-headedness and headaches. Hematological: Does not bruise/bleed easily. Psychiatric/Behavioral: Negative for agitation, confusion and hallucinations.        Objective:  -2.6L    /64   Pulse 75   Temp 98.3 °F (36.8 °C) (Oral)   Resp 18   Ht 5' 11\" (1.803 m)   Wt 178

## 2019-08-11 NOTE — PLAN OF CARE
Problem: Falls - Risk of:  Goal: Will remain free from falls  Description  Will remain free from falls  [id number]/44/[id number] by Eleno Ding RN  Outcome: Ongoing  Note:   Pt remains free of falls at this time. Problem: Pain:  Goal: Pain level will decrease  Description  Pain level will decrease  4/14/4782 2523 by Eleno Ding RN  Outcome: Ongoing  Note:   Pt denies pain at this time.       Problem: ACTIVITY INTOLERANCE/IMPAIRED MOBILITY  Goal: Mobility/activity is maintained at optimum level for patient  Outcome: Ongoing  Note:   Pt ambulating in hallway without assistance

## 2019-08-12 VITALS
TEMPERATURE: 98.1 F | WEIGHT: 177.4 LBS | OXYGEN SATURATION: 97 % | HEART RATE: 74 BPM | BODY MASS INDEX: 24.84 KG/M2 | HEIGHT: 71 IN | DIASTOLIC BLOOD PRESSURE: 68 MMHG | RESPIRATION RATE: 18 BRPM | SYSTOLIC BLOOD PRESSURE: 102 MMHG

## 2019-08-12 LAB
ANION GAP SERPL CALCULATED.3IONS-SCNC: 10 MMOL/L (ref 3–16)
BUN BLDV-MCNC: 22 MG/DL (ref 7–20)
CALCIUM SERPL-MCNC: 8.8 MG/DL (ref 8.3–10.6)
CHLORIDE BLD-SCNC: 101 MMOL/L (ref 99–110)
CO2: 26 MMOL/L (ref 21–32)
CREAT SERPL-MCNC: 1.2 MG/DL (ref 0.9–1.3)
GFR AFRICAN AMERICAN: >60
GFR NON-AFRICAN AMERICAN: >60
GLUCOSE BLD-MCNC: 104 MG/DL (ref 70–99)
POTASSIUM SERPL-SCNC: 4.8 MMOL/L (ref 3.5–5.1)
SODIUM BLD-SCNC: 137 MMOL/L (ref 136–145)

## 2019-08-12 PROCEDURE — 36415 COLL VENOUS BLD VENIPUNCTURE: CPT

## 2019-08-12 PROCEDURE — 6370000000 HC RX 637 (ALT 250 FOR IP): Performed by: INTERNAL MEDICINE

## 2019-08-12 PROCEDURE — 94761 N-INVAS EAR/PLS OXIMETRY MLT: CPT

## 2019-08-12 PROCEDURE — 6360000002 HC RX W HCPCS: Performed by: INTERNAL MEDICINE

## 2019-08-12 PROCEDURE — 80048 BASIC METABOLIC PNL TOTAL CA: CPT

## 2019-08-12 PROCEDURE — 97165 OT EVAL LOW COMPLEX 30 MIN: CPT

## 2019-08-12 PROCEDURE — 94640 AIRWAY INHALATION TREATMENT: CPT

## 2019-08-12 PROCEDURE — 97161 PT EVAL LOW COMPLEX 20 MIN: CPT

## 2019-08-12 PROCEDURE — 99232 SBSQ HOSP IP/OBS MODERATE 35: CPT | Performed by: INTERNAL MEDICINE

## 2019-08-12 PROCEDURE — 2700000000 HC OXYGEN THERAPY PER DAY

## 2019-08-12 PROCEDURE — 2580000003 HC RX 258: Performed by: NURSE PRACTITIONER

## 2019-08-12 PROCEDURE — 6370000000 HC RX 637 (ALT 250 FOR IP): Performed by: THORACIC SURGERY (CARDIOTHORACIC VASCULAR SURGERY)

## 2019-08-12 PROCEDURE — 6370000000 HC RX 637 (ALT 250 FOR IP): Performed by: NURSE PRACTITIONER

## 2019-08-12 RX ORDER — FUROSEMIDE 40 MG/1
40 TABLET ORAL DAILY
Qty: 60 TABLET | Refills: 3 | Status: SHIPPED | OUTPATIENT
Start: 2019-08-13

## 2019-08-12 RX ORDER — FUROSEMIDE 40 MG/1
40 TABLET ORAL DAILY
Status: DISCONTINUED | OUTPATIENT
Start: 2019-08-13 | End: 2019-08-13 | Stop reason: HOSPADM

## 2019-08-12 RX ORDER — LISINOPRIL 20 MG/1
20 TABLET ORAL DAILY
Qty: 30 TABLET | Refills: 3 | Status: SHIPPED | OUTPATIENT
Start: 2019-08-13

## 2019-08-12 RX ORDER — CARVEDILOL 6.25 MG/1
6.25 TABLET ORAL 2 TIMES DAILY WITH MEALS
Qty: 60 TABLET | Refills: 3 | Status: SHIPPED | OUTPATIENT
Start: 2019-08-12

## 2019-08-12 RX ADMIN — ASPIRIN 81 MG 81 MG: 81 TABLET ORAL at 08:30

## 2019-08-12 RX ADMIN — Medication 10 ML: at 08:30

## 2019-08-12 RX ADMIN — CARVEDILOL 6.25 MG: 6.25 TABLET, FILM COATED ORAL at 16:56

## 2019-08-12 RX ADMIN — ATORVASTATIN CALCIUM 80 MG: 80 TABLET, FILM COATED ORAL at 08:30

## 2019-08-12 RX ADMIN — IPRATROPIUM BROMIDE AND ALBUTEROL SULFATE 1 AMPULE: .5; 3 SOLUTION RESPIRATORY (INHALATION) at 20:39

## 2019-08-12 RX ADMIN — IPRATROPIUM BROMIDE AND ALBUTEROL SULFATE 1 AMPULE: .5; 3 SOLUTION RESPIRATORY (INHALATION) at 12:44

## 2019-08-12 RX ADMIN — CARVEDILOL 6.25 MG: 6.25 TABLET, FILM COATED ORAL at 08:30

## 2019-08-12 RX ADMIN — PANTOPRAZOLE SODIUM 40 MG: 40 TABLET, DELAYED RELEASE ORAL at 06:14

## 2019-08-12 RX ADMIN — LISINOPRIL 20 MG: 20 TABLET ORAL at 08:30

## 2019-08-12 RX ADMIN — IPRATROPIUM BROMIDE AND ALBUTEROL SULFATE 1 AMPULE: .5; 3 SOLUTION RESPIRATORY (INHALATION) at 07:33

## 2019-08-12 RX ADMIN — ENOXAPARIN SODIUM 40 MG: 40 INJECTION SUBCUTANEOUS at 08:30

## 2019-08-12 RX ADMIN — IPRATROPIUM BROMIDE AND ALBUTEROL SULFATE 1 AMPULE: .5; 3 SOLUTION RESPIRATORY (INHALATION) at 15:58

## 2019-08-12 ASSESSMENT — ENCOUNTER SYMPTOMS
PHOTOPHOBIA: 0
NAUSEA: 0
CONSTIPATION: 0
SHORTNESS OF BREATH: 1
EYE REDNESS: 0
FACIAL SWELLING: 0
BLOOD IN STOOL: 0
ABDOMINAL DISTENTION: 0
DIARRHEA: 0
EYE DISCHARGE: 0
VOMITING: 0
ABDOMINAL PAIN: 0
COUGH: 0
CHEST TIGHTNESS: 0

## 2019-08-12 ASSESSMENT — PAIN SCALES - GENERAL
PAINLEVEL_OUTOF10: 0

## 2019-08-12 NOTE — PROGRESS NOTES
Discharge order received, pt has transport set up to go to [facility] CVICU bed 1 at midnight with  mobile care. Pt is aware.  Number for report is [phone]

## 2019-08-12 NOTE — PROGRESS NOTES
06/28/13 238 lb (108 kg)       General appearance:  Appears comfortable  Eyes: Sclera clear. Pupils equal.  ENT: Moist oral mucosa. Trachea midline, no adenopathy. Cardiovascular: Regular rhythm, normal S1, S2. No murmur. No edema in lower extremities  Respiratory: Not using accessory muscles. Good inspiratory effort. Clear to auscultation bilaterally, no wheeze or crackles. GI: Abdomen soft, no tenderness, not distended, normal bowel sounds  Musculoskeletal: No cyanosis in digits, neck supple  Neurology: CN 2-12 grossly intact. Soft speech . No gross  Deficits  Psych: Normal affect. Alert and oriented in time, place and person  Skin: Warm, dry, normal turgor    Labs and Tests:  CBC:   No results for input(s): WBC, HGB, PLT in the last 72 hours. BMP:    Recent Labs     08/10/19  0532 08/11/19  1117 08/12/19  0509    135* 137   K 4.8 4.9 4.8    99 101   CO2 26 25 26   BUN 21* 22* 22*   CREATININE 1.1 1.3 1.2   GLUCOSE 122* 120* 104*     Hepatic: No results for input(s): AST, ALT, ALB, BILITOT, ALKPHOS in the last 72 hours. Discussed care with family and patient             Spent 30  minutes with patient and family at bedside and on unit reviewing medical records and labs, spent greater than 50% time counseling patient and family on diagnosis and plan   Problem List  Active Problems:    CAD, multiple vessel  Resolved Problems:    * No resolved hospital problems. *       Assessment & Plan:     1. Acute Systolic CHF:  Ef 34-35%. Diuresed with  IV Lasix and now on po lasix. - continue asa, coreg,  Statin, ACE inhibitor. Change lasix to daily. 2. Coronary artery disease: Angiogram showed multiple vessel disease. CABG planned. DuoNeb started. Bedside PFTs done. Preoperative work-up under progress    3. SARA : Improved overall. Change Lasix to daily. .  Nephrology on board. 4. History of recurrent strokes    5 obstructive sleep apnea.     6.  Anxiety/depression: Patient interested in

## 2019-08-12 NOTE — PROGRESS NOTES
Shift assessment complete, VSS at this time, remains afebrile. Pt denies pain upon morning assessment, no new skin issues noted. Updated on POC, aware that he will work with therapy today for baseline before surgery. Awaiting surgery date. Non-slip socks in place. Encouraged to use call light for assistance with ambulation, medium fall risk. Turns the bed alarm off on his own. Call light and belongings within reach, will continue to monitor.

## 2019-08-12 NOTE — CARE COORDINATION
Discharge Planning Assessment  RN/SW discharge planner met with patient/(and family member) to discuss reason for admission, current living situation, and potential needs at the time of discharge    Demographics/Insurance verified Yes/  Per PT notes, Patient lives Alone  Type of Home: House  Home Layout: One level, Laundry in basement  Home Access: Ramped entrance  Bathroom Shower/Tub: Tub/Shower unit  Bathroom Toilet: Standard  Bathroom Equipment: Tub transfer bench  Bathroom Accessibility: Accessible  Home Equipment: Cane, Rolling walker, [address]  ADL Assistance: Independent  Homemaking Assistance: (sister does laundry)  Ambulation Assistance: Independent  Transfer Assistance: Independent  Active : Yes  Mode of Transportation: Car    Transportation at the time of discharge: self    Tentative discharge plan: Home alone I-PTA.

## 2019-08-12 NOTE — PROGRESS NOTES
Occupational Therapy   Occupational Therapy Initial Assessment  Date: 2019   Patient Name: Joel Clarke  MRN: [id number]     : 1980    Date of Service: 2019    Discharge Recommendations:  Joel Clarke scored a 24/24 on the -[facility] ADL Inpatient form. At this time, no further OT is recommended upon discharge due to pt being at baseline. Recommend patient returns to prior setting with prior services. OT Equipment Recommendations  Equipment Needed: No    Assessment   Assessment: pt at baseline, no futher skilled OT services indicated at this time. pt to have CABG, consult OT/PT s/p CABG  Treatment Diagnosis: CHF  Decision Making: Low Complexity  OT Education: Precautions; ADL Adaptive Strategies;Transfer Training(s/p CABG, adaptive strageties to practice prior to CABG)  REQUIRES OT FOLLOW UP: No  Activity Tolerance  Activity Tolerance: Patient Tolerated treatment well  Safety Devices  Safety Devices in place: Yes  Type of devices: All fall risk precautions in place;Nurse notified; Patient at risk for falls;Call light within reach; Left in bed(left seated EOB, med fall risk)           Patient Diagnosis(es): There were no encounter diagnoses. has a past medical history of Arthritis, At risk for falls, Blood clots in biliary tract following procedure, CVA (cerebral infarction), GERD (gastroesophageal reflux disease), High blood pressure, Hyperlipidemia, Hypertension, Spasticity, Stroke ([address] and Unspecified sleep apnea. has a past surgical history that includes Knee arthroscopy; hernia repair; and fracture surgery. Treatment Diagnosis: CHF      Restrictions  Restrictions/Precautions  Restrictions/Precautions: Fall Risk(med fall risk)  Position Activity Restriction  Other position/activity restrictions:  PT/OT to evaluate his strength, mobility and optimize him before sternotomy. Subjective   General  Chart Reviewed:  Yes  Additional Pertinent Hx: CVA  Family / Caregiver independent (doffing/donning socks)  Additional Comments: discussed ADL transfers without use of B UEs for practice prior to CABG  Tone RUE  RUE Tone: Normotonic  Tone LUE  LUE Tone: Normotonic  Coordination  Movements Are Fluid And Coordinated: Yes     Bed mobility  Comment: did not assess but discussed bed mobility s/p CABG  Transfers  Sit to stand: Independent  Stand to sit:  Independent     Cognition  Overall Cognitive Status: WFL  Perception  Overall Perceptual Status: WFL     Sensation  Overall Sensation Status: WFL        LUE AROM (degrees)  LUE AROM : WFL  RUE AROM (degrees)  RUE AROM : WFL  LUE Strength  Gross LUE Strength: (WNL R UE, MMT 4 on L UE)  LUE Strength Comment: decreased stength noted in L UE, secondary to CVA in 2012                   Plan   Plan  Times per week: eval and d/c    G-Code     OutComes Score                                                  AM-PAC Score        AM-PAC Inpatient Daily Activity Raw Score: 24 (08/12/19 0930)  AM-PAC Inpatient ADL T-Scale Score : 57.54 (08/12/19 0930)  ADL Inpatient CMS 0-100% Score: 0 (08/12/19 0930)  ADL Inpatient CMS G-Code Modifier : [address] (08/12/19 0930)    Goals  Patient Goals   Patient goals : pt at baseline, no futher skilled OT services indicated at this time        Therapy Time   Individual Concurrent Group Co-treatment   Time In 0903         Time Out 0921         Minutes 18              Timed Code Treatment Minutes:   0    Total Treatment Minutes:  18 minutes    Sandy Payne, OTR/L [id number] [id number], OT

## 2019-08-13 NOTE — PLAN OF CARE
Problem: Falls - Risk of:  Goal: Will remain free from falls  Description  Will remain free from falls  8/12/2019 2123 by Nettie Park RN  Outcome: Completed     Problem: Falls - Risk of:  Goal: Absence of physical injury  Description  Absence of physical injury  Outcome: Completed     Problem: Pain:  Goal: Pain level will decrease  Description  Pain level will decrease  8/12/2019 2123 by Nettie Park RN  Outcome: Completed     Problem: Pain:  Goal: Control of acute pain  Description  Control of acute pain  Outcome: Completed     Problem: Pain:  Goal: Control of chronic pain  Description  Control of chronic pain  Outcome: Completed     Problem: OXYGENATION/RESPIRATORY FUNCTION  Goal: Patient will achieve/maintain normal respiratory rate/effort  Description  Respiratory rate and effort will be within normal limits for the patient  Outcome: Completed     Problem: FLUID AND ELECTROLYTE IMBALANCE  Goal: Fluid and electrolyte balance are achieved/maintained  Outcome: Completed     Problem: ACTIVITY INTOLERANCE/IMPAIRED MOBILITY  Goal: Mobility/activity is maintained at optimum level for patient  8/12/2019 2123 by Nettie Park RN  Outcome: Completed    Problem: Musculor/Skeletal Functional Status  Goal: Highest potential functional level  Outcome: Completed     Problem: Musculor/Skeletal Functional Status  Goal: Absence of falls  Outcome: Completed

## 2019-08-13 NOTE — PROGRESS NOTES
Transport arrived at 2110 to  pt. Pt belongings gathered report given to paramedics. Report to be called to [facility] CVICU. Pt left with transport at 2120. Pt stable at time of departure.  Kashif Houston RN

## 2020-06-19 NOTE — PROGRESS NOTES
Physical Therapy    Facility/Department: [address]  Initial Assessment/Discharge Summary    NAME: Mindy Zelaya  : 1980  MRN: [id number]    Date of Service: 2019    Discharge Recommendations:  Mindy Zelaya scored a 24/24 on the AM-PAC short mobility form. At this time, no further PT is recommended upon discharge as patient presents at baseline function. Recommend patient returns to prior setting with prior services. PT Equipment Recommendations  Equipment Needed: No    Assessment   Assessment: Patient presents at baseline fucntion. Is able to safely perform transfers while following sternal precautions and ambulates with RW safely. Recommend use of AFO (which patient owns) post-op. Please re-order post-op for re-assessment. Decision Making: Low Complexity  Clinical Presentation: stable  PT Education: Goals;PT Role;Plan of Care;Precautions;Gait Training  Barriers to Learning: None  REQUIRES PT FOLLOW UP: No  Activity Tolerance  Activity Tolerance: Patient Tolerated treatment well       Patient Diagnosis(es): There were no encounter diagnoses. has a past medical history of Arthritis, At risk for falls, Blood clots in biliary tract following procedure, CVA (cerebral infarction), GERD (gastroesophageal reflux disease), High blood pressure, Hyperlipidemia, Hypertension, Spasticity, Stroke ([address] and Unspecified sleep apnea. has a past surgical history that includes Knee arthroscopy; hernia repair; and fracture surgery. Restrictions  Restrictions/Precautions  Restrictions/Precautions: Fall Risk(med fall risk)  Required Braces or Orthoses?: No  Position Activity Restriction  Other position/activity restrictions:  PT/OT to evaluate his strength, mobility and optimize him before sternotomy.     Vision/Hearing  Vision: Impaired  Hearing: Within functional limits     Subjective  General  Chart Reviewed: Yes  Family / Caregiver Present: No  Diagnosis: CHF  Follows [facility]